# Patient Record
Sex: MALE | URBAN - METROPOLITAN AREA
[De-identification: names, ages, dates, MRNs, and addresses within clinical notes are randomized per-mention and may not be internally consistent; named-entity substitution may affect disease eponyms.]

---

## 2023-06-16 ENCOUNTER — OFFICE VISIT (OUTPATIENT)
Dept: URBAN - METROPOLITAN AREA CLINIC 86 | Facility: CLINIC | Age: 78
End: 2023-06-16
Payer: COMMERCIAL

## 2023-06-16 DIAGNOSIS — H43.811 VITREOUS DEGENERATION, RIGHT EYE: ICD-10-CM

## 2023-06-16 DIAGNOSIS — H33.311 HORSESHOE TEAR OF RETINA WITHOUT DETACHMENT, RIGHT EYE: ICD-10-CM

## 2023-06-16 DIAGNOSIS — H59.022 CATARACT (LENS) FRAGMT IN EYE FOL CATARACT SURGERY, LEFT EYE: Primary | ICD-10-CM

## 2023-06-16 DIAGNOSIS — H25.11 AGE-RELATED NUCLEAR CATARACT, RIGHT EYE: ICD-10-CM

## 2023-06-16 PROCEDURE — 99204 OFFICE O/P NEW MOD 45 MIN: CPT | Performed by: OPHTHALMOLOGY

## 2023-06-16 RX ORDER — CIPROFLOXACIN HYDROCHLORIDE 3 MG/ML
0.3 % SOLUTION/ DROPS OPHTHALMIC
Qty: 5 | Refills: 1 | Status: INACTIVE
Start: 2023-06-16 | End: 2023-07-15

## 2023-06-16 RX ORDER — PREDNISOLONE ACETATE 10 MG/ML
1 % SUSPENSION/ DROPS OPHTHALMIC
Qty: 5 | Refills: 2 | Status: INACTIVE
Start: 2023-06-16 | End: 2023-08-14

## 2023-06-16 ASSESSMENT — INTRAOCULAR PRESSURE
OD: 17
OS: 22

## 2023-06-16 NOTE — IMPRESSION/PLAN
Impression: Horseshoe tear of retina without detachment, right eye: H33.311. Right.
-s/p Laser Plan: Retina sealed with laser. Doing well.  SSRD

## 2023-06-16 NOTE — IMPRESSION/PLAN
Impression: Cataract (lens) fragmt in eye fol cataract surgery, left eye: H59.022.
-s/p cat sx 2 weeks ago with RLF

OCT:
OD: wnl OS: poor view but looks flat Plan: Examination reveals a retained piece of lens material in the vitreous cavity after cataract surgery. Given the quantity and potential for significant inflammation and elevated intraocular pressure, surgery is recommended. We discussed the risks, benefits, indications, and alternatives to vitrectomy and lensectomy. The risks include, but are not limited to infection, retinal tear or detachment, glaucoma, hemorrhage, loss of eye and blindness, chronic CME, and need for additional surgery. The patient understands that with vitrectomy, the vision can improve, but sometimes does not improve fully and sometimes the vision does not improve at all. Also, it can take months to years for the vision to improve. The patient elects to proceed with surgery. Continue all drops including IOP meds per Dr Carballo Cancer PLAN: 25g PPV, PPL OS x RLF (30mins, Monday 06/19/23)

## 2023-06-20 ENCOUNTER — POST-OPERATIVE VISIT (OUTPATIENT)
Dept: URBAN - METROPOLITAN AREA CLINIC 13 | Facility: CLINIC | Age: 78
End: 2023-06-20
Payer: COMMERCIAL

## 2023-06-20 DIAGNOSIS — Z48.810 ENCOUNTER FOR SURGICAL AFTERCARE FOLLOWING SURGERY ON A SENSE ORGAN: Primary | ICD-10-CM

## 2023-06-20 DIAGNOSIS — H59.022 CATARACT (LENS) FRAGMENTS IN EYE FOLLOWING CATARACT SURGERY, LEFT EYE: ICD-10-CM

## 2023-06-20 PROCEDURE — 99024 POSTOP FOLLOW-UP VISIT: CPT | Performed by: OPHTHALMOLOGY

## 2023-06-20 ASSESSMENT — INTRAOCULAR PRESSURE
OD: 17
OS: 25

## 2023-06-20 NOTE — IMPRESSION/PLAN
Impression: S/P 25g PPV, PPL OS x RLF OS - 1 Day. Encounter for surgical aftercare following surgery on a sense organ  Z48.810. Plan: Pain is well controlled. The retina appears attached. The IOP is slightly elevated, continue timolol two times a day. Patient is to use following drops: Prednisolone 1% 1 drop 4 times a day, Ofloxacin 0.3% 1 drop 4 times a day. No heavy lifting, strenuous activity, or bending for 2 weeks. Patient is to follow up in 1 week for [facility] [place] [facility] in surgical eye.

## 2023-06-30 ENCOUNTER — POST-OPERATIVE VISIT (OUTPATIENT)
Dept: URBAN - METROPOLITAN AREA CLINIC 86 | Facility: CLINIC | Age: 78
End: 2023-06-30
Payer: COMMERCIAL

## 2023-06-30 DIAGNOSIS — Z48.810 ENCOUNTER FOR SURGICAL AFTERCARE FOLLOWING SURGERY ON THE SENSE ORGANS: ICD-10-CM

## 2023-06-30 DIAGNOSIS — H59.022 CATARACT (LENS) FRAGMENTS IN EYE FOLLOWING CATARACT SURGERY, LEFT EYE: Primary | ICD-10-CM

## 2023-06-30 PROCEDURE — 99024 POSTOP FOLLOW-UP VISIT: CPT | Performed by: OPHTHALMOLOGY

## 2023-06-30 ASSESSMENT — INTRAOCULAR PRESSURE
OS: 27
OD: 19

## 2023-06-30 NOTE — IMPRESSION/PLAN
Impression: S/P 25g PPV, PPL OS x RLF OS - 11 Days. Cataract (lens) fragments in eye following cataract surgery, left eye  H59.022. Plan: Taper PF Stop Oflox Timolol BID (pt will call in to confirm he has it) RTC 1 month DFE OS OCT OS

## 2023-07-24 ENCOUNTER — POST-OPERATIVE VISIT (OUTPATIENT)
Dept: URBAN - METROPOLITAN AREA CLINIC 86 | Facility: CLINIC | Age: 78
End: 2023-07-24
Payer: COMMERCIAL

## 2023-07-24 DIAGNOSIS — H59.022 CATARACT (LENS) FRAGMENTS IN EYE FOLLOWING CATARACT SURGERY, LEFT EYE: Primary | ICD-10-CM

## 2023-07-24 PROCEDURE — 99024 POSTOP FOLLOW-UP VISIT: CPT | Performed by: OPHTHALMOLOGY

## 2023-07-24 ASSESSMENT — INTRAOCULAR PRESSURE
OD: 23
OS: 30